# Patient Record
Sex: MALE | URBAN - METROPOLITAN AREA
[De-identification: names, ages, dates, MRNs, and addresses within clinical notes are randomized per-mention and may not be internally consistent; named-entity substitution may affect disease eponyms.]

---

## 2020-11-12 ENCOUNTER — OFFICE VISIT (OUTPATIENT)
Dept: URBAN - METROPOLITAN AREA CLINIC 88 | Facility: CLINIC | Age: 55
End: 2020-11-12
Payer: MEDICAID

## 2020-11-12 DIAGNOSIS — H25.12 AGE-RELATED NUCLEAR CATARACT, LEFT EYE: Chronic | ICD-10-CM

## 2020-11-12 DIAGNOSIS — H53.8 OTHER VISUAL DISTURBANCES: Primary | Chronic | ICD-10-CM

## 2020-11-12 DIAGNOSIS — H17.89 OTHER CORNEAL SCAR: Chronic | ICD-10-CM

## 2020-11-12 PROCEDURE — 99204 OFFICE O/P NEW MOD 45 MIN: CPT | Performed by: OPTOMETRIST

## 2020-11-12 ASSESSMENT — INTRAOCULAR PRESSURE
OS: 16
OD: 10

## 2020-11-12 ASSESSMENT — VISUAL ACUITY
OD: HM
OS: HM

## 2020-11-12 ASSESSMENT — KERATOMETRY: OS: 44.00

## 2020-11-12 NOTE — IMPRESSION/PLAN
Impression: Dry eye syndrome of bilateral lacrimal glands: H04.123. Plan: Discussed condition with patient in detail. Recommend treatment with Preservative Free Artificial tears QID. RTC if symptoms continue.

## 2020-11-12 NOTE — IMPRESSION/PLAN
Impression: Crystalline deposits in vitreous body, left eye: H43.22. Plan: Discussed status. See #1 above.

## 2020-11-12 NOTE — IMPRESSION/PLAN
Impression: Other visual disturbances: H53.8. Plan: Discussed status in detail. Longstanding decrease in acuity per patient with OS getting worse. OCT macula OS today, flat. Suspect potential cortical vision loss. Also, consider ddx of asteroid hyalosis for sympathetic ophthalmia/chronic vitritis. Recommend evaluation with Dr. Ewa Abebe or Dr. Gerhardt Dana in next 1-2 weeks for additional evaluation/opinion.

## 2020-11-12 NOTE — IMPRESSION/PLAN
Impression: Other corneal scar: H17.89. Plan: Discussed status in detail. History of HZV keratitis. Guarded prognosis reviewed. Latricia Wagoner

## 2020-11-12 NOTE — IMPRESSION/PLAN
Impression: Age-related nuclear cataract, left eye: H25.12. Plan: Reviewed status of cataract with patient and potential effect on vision. Patient not experiencing a decrease in quality of life from cataracts. No treatment at this time. Patient will monitor for any decrease in vision and notify clinic if new symptoms experienced.

## 2020-11-16 ENCOUNTER — OFFICE VISIT (OUTPATIENT)
Dept: URBAN - METROPOLITAN AREA CLINIC 88 | Facility: CLINIC | Age: 55
End: 2020-11-16
Payer: MEDICAID

## 2020-11-16 DIAGNOSIS — H43.22 CRYSTALLINE DEPOSITS IN VITREOUS BODY, LEFT EYE: ICD-10-CM

## 2020-11-16 DIAGNOSIS — H04.123 DRY EYE SYNDROME OF BILATERAL LACRIMAL GLANDS: ICD-10-CM

## 2020-11-16 DIAGNOSIS — B02.39 OTHER HERPES ZOSTER EYE DISEASE: ICD-10-CM

## 2020-11-16 PROCEDURE — 99203 OFFICE O/P NEW LOW 30 MIN: CPT | Performed by: OPHTHALMOLOGY

## 2020-11-16 ASSESSMENT — INTRAOCULAR PRESSURE
OS: 12
OD: 10

## 2020-11-16 NOTE — IMPRESSION/PLAN
Impression: Crystalline deposits in vitreous body, left eye: H43.22. Plan: Discussed diagnosis in detail with patient. Discussed signs and symptoms of PVD/floaters.

## 2020-11-16 NOTE — IMPRESSION/PLAN
Impression: Other corneal scar: H17.89. Plan: I would recommend patient by seen by Cornea Specialist (Cathy SHELDON) to see if patient is a candidate for Penetrating Keratoplasty OD. He also need a work up with a Rheumatologist to see if he has any type of Auto-Immune disorder, collagen vascular disease or Dermatological disease associated with his eye findings. I will let the Corneal Specialist the necessary lab work after his evaluation. I recommend OTC Genteal gel or Systane gel Q2hrs OD and QID OS. The patient may also benefit from Visual Evoked Potential study of his left eye.